# Patient Record
Sex: MALE | Race: WHITE | NOT HISPANIC OR LATINO | Employment: STUDENT | URBAN - METROPOLITAN AREA
[De-identification: names, ages, dates, MRNs, and addresses within clinical notes are randomized per-mention and may not be internally consistent; named-entity substitution may affect disease eponyms.]

---

## 2020-09-17 ENCOUNTER — OFFICE VISIT (OUTPATIENT)
Dept: INTERNAL MEDICINE | Facility: CLINIC | Age: 25
End: 2020-09-17
Payer: COMMERCIAL

## 2020-09-17 ENCOUNTER — IMMUNIZATION (OUTPATIENT)
Dept: INTERNAL MEDICINE | Facility: CLINIC | Age: 25
End: 2020-09-17
Payer: COMMERCIAL

## 2020-09-17 VITALS
OXYGEN SATURATION: 99 % | TEMPERATURE: 98 F | BODY MASS INDEX: 24.11 KG/M2 | DIASTOLIC BLOOD PRESSURE: 86 MMHG | HEART RATE: 63 BPM | WEIGHT: 181.88 LBS | SYSTOLIC BLOOD PRESSURE: 120 MMHG | HEIGHT: 73 IN

## 2020-09-17 DIAGNOSIS — R35.0 URINARY FREQUENCY: ICD-10-CM

## 2020-09-17 DIAGNOSIS — Z23 NEED FOR INFLUENZA VACCINATION: ICD-10-CM

## 2020-09-17 DIAGNOSIS — Z00.00 ANNUAL PHYSICAL EXAM: Primary | ICD-10-CM

## 2020-09-17 LAB
BILIRUB UR QL STRIP: NEGATIVE
CLARITY UR REFRACT.AUTO: CLEAR
COLOR UR AUTO: YELLOW
GLUCOSE UR QL STRIP: NEGATIVE
HGB UR QL STRIP: NEGATIVE
KETONES UR QL STRIP: NEGATIVE
LEUKOCYTE ESTERASE UR QL STRIP: NEGATIVE
MICROSCOPIC COMMENT: NORMAL
NITRITE UR QL STRIP: NEGATIVE
PH UR STRIP: 5 [PH] (ref 5–8)
PROT UR QL STRIP: NEGATIVE
RBC #/AREA URNS AUTO: 0 /HPF (ref 0–4)
SP GR UR STRIP: 1.03 (ref 1–1.03)
URN SPEC COLLECT METH UR: NORMAL
WBC #/AREA URNS AUTO: 1 /HPF (ref 0–5)

## 2020-09-17 PROCEDURE — 81001 URINALYSIS AUTO W/SCOPE: CPT

## 2020-09-17 PROCEDURE — 90471 FLU VACCINE (QUAD) GREATER THAN OR EQUAL TO 3YO PRESERVATIVE FREE IM: ICD-10-PCS | Mod: S$GLB,,, | Performed by: FAMILY MEDICINE

## 2020-09-17 PROCEDURE — 99385 PREV VISIT NEW AGE 18-39: CPT | Mod: 25,S$GLB,, | Performed by: FAMILY MEDICINE

## 2020-09-17 PROCEDURE — 99385 PR PREVENTIVE VISIT,NEW,18-39: ICD-10-PCS | Mod: 25,S$GLB,, | Performed by: FAMILY MEDICINE

## 2020-09-17 PROCEDURE — 90471 IMMUNIZATION ADMIN: CPT | Mod: S$GLB,,, | Performed by: FAMILY MEDICINE

## 2020-09-17 PROCEDURE — 87086 URINE CULTURE/COLONY COUNT: CPT

## 2020-09-17 PROCEDURE — 99999 PR PBB SHADOW E&M-NEW PATIENT-LVL IV: ICD-10-PCS | Mod: PBBFAC,,, | Performed by: FAMILY MEDICINE

## 2020-09-17 PROCEDURE — 99999 PR PBB SHADOW E&M-NEW PATIENT-LVL IV: CPT | Mod: PBBFAC,,, | Performed by: FAMILY MEDICINE

## 2020-09-17 PROCEDURE — 90686 IIV4 VACC NO PRSV 0.5 ML IM: CPT | Mod: S$GLB,,, | Performed by: FAMILY MEDICINE

## 2020-09-17 PROCEDURE — 90686 FLU VACCINE (QUAD) GREATER THAN OR EQUAL TO 3YO PRESERVATIVE FREE IM: ICD-10-PCS | Mod: S$GLB,,, | Performed by: FAMILY MEDICINE

## 2020-09-17 RX ORDER — BENZOYL PEROXIDE 146.7 MG/G
CREAM TOPICAL
COMMUNITY
Start: 2020-07-13 | End: 2022-01-27

## 2020-09-17 RX ORDER — CRISABOROLE 20 MG/G
OINTMENT TOPICAL 2 TIMES DAILY
COMMUNITY
End: 2022-07-26

## 2020-09-17 NOTE — PROGRESS NOTES
Subjective:       Patient ID: Braeden Keita is a 24 y.o. male.    Chief Complaint:   Establish Care and Urinary Frequency    Annual Exam  Last annual exam > 1 year ago.        Urinary Frequency   This is a new problem. Episode onset: beginning of August, 2020. The problem has been unchanged. The patient is experiencing no pain. There has been no fever. Associated symptoms include frequency. Pertinent negatives include no chills, discharge, flank pain, nausea, vomiting or rash.   Started with an episode where he couldn't urinate at all for several hours.  Went to urgent care.  U/A normal.  Was referred to urologist.  Cystoscopy and ultrasound normal.  Was told may be blockage from something else.  Has appt for colonoscopy in 1 month.  Review of Systems   Constitutional: Negative for appetite change, chills and fever.   HENT: Negative for ear pain and sore throat.    Eyes: Negative for pain and visual disturbance.   Respiratory: Negative for cough and shortness of breath.    Cardiovascular: Negative for chest pain.   Gastrointestinal: Negative for abdominal pain, nausea and vomiting.   Endocrine: Negative for polydipsia, polyphagia and polyuria.   Genitourinary: Positive for frequency. Negative for difficulty urinating and flank pain.   Musculoskeletal: Negative for arthralgias and myalgias.   Skin: Negative for rash.   Neurological: Negative for dizziness and headaches.   Psychiatric/Behavioral: Negative for behavioral problems.     Current Outpatient Medications   Medication Sig    crisaborole (EUCRISA) 2 % Oint Apply topically 2 (two) times daily.    PANOXYL 10 % external wash APPLY QD IN SHOWER TO ACNE BODY AND WASH OFF     No current facility-administered medications for this visit.      History reviewed. No pertinent past medical history.  History reviewed. No pertinent family history.  Social History     Tobacco Use    Smoking status: Former Smoker    Smokeless tobacco: Never Used   Substance Use Topics  "   Alcohol use: Yes     Alcohol/week: 4.0 standard drinks     Types: 2 Glasses of wine, 2 Cans of beer per week     Frequency: 2-4 times a month     Drinks per session: 1 or 2    Drug use: Never       Objective:      Vitals:    09/17/20 0814   BP: 120/86   BP Location: Right arm   Pulse: 63   Temp: 97.6 °F (36.4 °C)   SpO2: 99%   Weight: 82.5 kg (181 lb 14.1 oz)   Height: 6' 1" (1.854 m)     Physical Exam  Vitals signs and nursing note reviewed.   Constitutional:       Appearance: He is well-developed.   HENT:      Head: Normocephalic and atraumatic.      Right Ear: Tympanic membrane and external ear normal.      Left Ear: Tympanic membrane and external ear normal.      Nose: Nose normal.   Eyes:      Conjunctiva/sclera: Conjunctivae normal.      Pupils: Pupils are equal, round, and reactive to light.   Neck:      Musculoskeletal: Normal range of motion and neck supple.      Thyroid: No thyromegaly.      Vascular: No carotid bruit.   Cardiovascular:      Rate and Rhythm: Normal rate and regular rhythm.      Heart sounds: Normal heart sounds. No murmur. No friction rub. No gallop.    Pulmonary:      Effort: Pulmonary effort is normal.      Breath sounds: Normal breath sounds. No wheezing.   Abdominal:      General: Bowel sounds are normal.      Palpations: Abdomen is soft.      Tenderness: There is no abdominal tenderness.   Musculoskeletal: Normal range of motion.   Lymphadenopathy:      Cervical: No cervical adenopathy.   Skin:     General: Skin is warm and dry.      Findings: No rash.   Neurological:      Mental Status: He is alert and oriented to person, place, and time.   Psychiatric:         Behavior: Behavior normal.              Assessment and Plan:     Annual physical exam  -     CBC auto differential; Future  -     Comprehensive metabolic panel; Future  -     Lipid Panel; Future  -     TSH; Future  -     Urinalysis; Future  -     Hepatitis C Antibody; Future  -     HIV 1/2 Ag/Ab (4th Gen); Future; " Expected date: 09/17/2020    Need for influenza vaccination    Urinary frequency  -     Urine culture; Future    If frequency persists and GI workup normal will consider referral to another urologist for second opinion.     Follow up if symptoms worsen or fail to improve.      Ambrose Melendez MD

## 2020-09-17 NOTE — PATIENT INSTRUCTIONS
Urinary Retention (Male)  Urinary retention is the medical term for difficulty or inability to pass urine, even though your bladder is full.  Causes  The most common cause of urinary retention in men is the bladder outlet being blocked. This can be due to an enlarged prostate gland or a bladder infection. Certain medicines can also cause this problem. This condition is more likely to occur as men get older.    This condition is treated by insertion of a catheter into the bladder to drain the urine. This provides immediate relief. The catheter may need to remain in place for a few days to prevent a recurrence. The catheter has a balloon on the tip which was inflated after insertion. This prevents the catheter from falling out.  Symptoms  Common symptoms of urinary retention include:  · Pain (not experienced by everyone)  · Frequent urination  · Feeling that the bladder is still full after urinating  · Incontinence (not being able to control the release of urine)  · Swollen abdomen  Treatment  This condition is treated by inserting a tube (catheter) into the bladder to drain the urine. This provides immediate relief. The catheter may need to stay in place for a few days. The catheter has a balloon on the tip, which is inflated after insertion. This prevents the catheter from falling out.  Home care  · If you were given antibiotics, take them until they are used up, or your healthcare provider tells you to stop. It is important to finish the antibiotics even though you feel better. This is to make sure your infection has cleared.  · If a catheter was left in place, it is important to keep bacteria from getting into the collection bag. Do not disconnect the catheter from the collection bag.  · Use a leg band to secure the drainage tube, so it does not pull on the catheter. Drain the collection bag when it becomes full using the drain spout at the bottom of the bag.  · Do not pull on or try to remove your catheter.  This will injure your urethra. The catheter must be removed by a healthcare provider.  Follow-up care  Follow up with your healthcare provider, or as advised.  If a catheter was left in place, it can usually be removed within 3 to 7 days. Some conditions require the catheter to stay in longer. Your healthcare provider will tell you when to return to have the catheter removed.  When to seek medical advice  Call your healthcare provider right away if any of these occur:  · Fever of 100.4ºF (38ºC) or higher, or as directed by your healthcare provider  · Bladder or lower-abdominal pain or fullness  · Abdominal swelling, nausea, vomiting, or back pain  · Blood or urine leakage around the catheter  · Bloody urine coming from the catheter (if a new symptom)  · Weakness, dizziness, or fainting  · Confusion or change in usual level of alertness  · If a catheter was left in place, return if:  ¨ Catheter falls out  ¨ Catheter stops draining for 6 hours  Date Last Reviewed: 7/26/2015  © 1189-0281 [employer]. [address] [place] [address], [place] [address]. All rights reserved. This information is not intended as a substitute for professional medical care. Always follow your healthcare professional's instructions.

## 2020-09-17 NOTE — PROGRESS NOTES
Administered flu vaccine to the left deltoid, tolerated well, no c/o pain noted, no adverse reaction noted within.

## 2020-09-18 LAB — BACTERIA UR CULT: NO GROWTH

## 2020-09-19 ENCOUNTER — LAB VISIT (OUTPATIENT)
Dept: LAB | Facility: HOSPITAL | Age: 25
End: 2020-09-19
Attending: FAMILY MEDICINE
Payer: COMMERCIAL

## 2020-09-19 DIAGNOSIS — Z00.00 ANNUAL PHYSICAL EXAM: ICD-10-CM

## 2020-09-19 LAB
ALBUMIN SERPL BCP-MCNC: 4.1 G/DL (ref 3.5–5.2)
ALP SERPL-CCNC: 46 U/L (ref 55–135)
ALT SERPL W/O P-5'-P-CCNC: 18 U/L (ref 10–44)
ANION GAP SERPL CALC-SCNC: 8 MMOL/L (ref 8–16)
AST SERPL-CCNC: 18 U/L (ref 10–40)
BASOPHILS # BLD AUTO: 0.03 K/UL (ref 0–0.2)
BASOPHILS NFR BLD: 0.5 % (ref 0–1.9)
BILIRUB SERPL-MCNC: 0.7 MG/DL (ref 0.1–1)
BUN SERPL-MCNC: 18 MG/DL (ref 6–20)
CALCIUM SERPL-MCNC: 8.8 MG/DL (ref 8.7–10.5)
CHLORIDE SERPL-SCNC: 104 MMOL/L (ref 95–110)
CHOLEST SERPL-MCNC: 133 MG/DL (ref 120–199)
CHOLEST/HDLC SERPL: 3.5 {RATIO} (ref 2–5)
CO2 SERPL-SCNC: 29 MMOL/L (ref 23–29)
CREAT SERPL-MCNC: 1.1 MG/DL (ref 0.5–1.4)
DIFFERENTIAL METHOD: NORMAL
EOSINOPHIL # BLD AUTO: 0.1 K/UL (ref 0–0.5)
EOSINOPHIL NFR BLD: 1 % (ref 0–8)
ERYTHROCYTE [DISTWIDTH] IN BLOOD BY AUTOMATED COUNT: 11.9 % (ref 11.5–14.5)
EST. GFR  (AFRICAN AMERICAN): >60 ML/MIN/1.73 M^2
EST. GFR  (NON AFRICAN AMERICAN): >60 ML/MIN/1.73 M^2
GLUCOSE SERPL-MCNC: 87 MG/DL (ref 70–110)
HCT VFR BLD AUTO: 49 % (ref 40–54)
HDLC SERPL-MCNC: 38 MG/DL (ref 40–75)
HDLC SERPL: 28.6 % (ref 20–50)
HGB BLD-MCNC: 16.3 G/DL (ref 14–18)
IMM GRANULOCYTES # BLD AUTO: 0.01 K/UL (ref 0–0.04)
IMM GRANULOCYTES NFR BLD AUTO: 0.2 % (ref 0–0.5)
LDLC SERPL CALC-MCNC: 83.2 MG/DL (ref 63–159)
LYMPHOCYTES # BLD AUTO: 2 K/UL (ref 1–4.8)
LYMPHOCYTES NFR BLD: 33.6 % (ref 18–48)
MCH RBC QN AUTO: 29.5 PG (ref 27–31)
MCHC RBC AUTO-ENTMCNC: 33.3 G/DL (ref 32–36)
MCV RBC AUTO: 89 FL (ref 82–98)
MONOCYTES # BLD AUTO: 0.7 K/UL (ref 0.3–1)
MONOCYTES NFR BLD: 11.2 % (ref 4–15)
NEUTROPHILS # BLD AUTO: 3.2 K/UL (ref 1.8–7.7)
NEUTROPHILS NFR BLD: 53.5 % (ref 38–73)
NONHDLC SERPL-MCNC: 95 MG/DL
NRBC BLD-RTO: 0 /100 WBC
PLATELET # BLD AUTO: 238 K/UL (ref 150–350)
PMV BLD AUTO: 9.9 FL (ref 9.2–12.9)
POTASSIUM SERPL-SCNC: 4 MMOL/L (ref 3.5–5.1)
PROT SERPL-MCNC: 6.5 G/DL (ref 6–8.4)
RBC # BLD AUTO: 5.52 M/UL (ref 4.6–6.2)
SODIUM SERPL-SCNC: 141 MMOL/L (ref 136–145)
TRIGL SERPL-MCNC: 59 MG/DL (ref 30–150)
TSH SERPL DL<=0.005 MIU/L-ACNC: 1.85 UIU/ML (ref 0.4–4)
WBC # BLD AUTO: 6.05 K/UL (ref 3.9–12.7)

## 2020-09-19 PROCEDURE — 80053 COMPREHEN METABOLIC PANEL: CPT

## 2020-09-19 PROCEDURE — 85025 COMPLETE CBC W/AUTO DIFF WBC: CPT

## 2020-09-19 PROCEDURE — 86703 HIV-1/HIV-2 1 RESULT ANTBDY: CPT

## 2020-09-19 PROCEDURE — 84443 ASSAY THYROID STIM HORMONE: CPT

## 2020-09-19 PROCEDURE — 86803 HEPATITIS C AB TEST: CPT

## 2020-09-19 PROCEDURE — 80061 LIPID PANEL: CPT

## 2020-09-19 PROCEDURE — 36415 COLL VENOUS BLD VENIPUNCTURE: CPT

## 2020-09-21 ENCOUNTER — TELEPHONE (OUTPATIENT)
Dept: INTERNAL MEDICINE | Facility: CLINIC | Age: 25
End: 2020-09-21

## 2020-09-21 LAB
HCV AB SERPL QL IA: NEGATIVE
HIV 1+2 AB+HIV1 P24 AG SERPL QL IA: NEGATIVE

## 2020-09-21 NOTE — TELEPHONE ENCOUNTER
----- Message from Ambrose Melendez MD sent at 9/21/2020  9:37 AM CDT -----  His blood tests are normal.  Urine is clear.  No signs of infection.

## 2020-09-21 NOTE — TELEPHONE ENCOUNTER
----- Message from Josefina Puga sent at 9/21/2020 12:09 PM CDT -----  Contact: [phone]  Patient is returning a phone call.  Who left a message for the patient: Suhas  Does patient know what this is regarding:  no  Comments:

## 2020-11-30 ENCOUNTER — TELEPHONE (OUTPATIENT)
Dept: INTERNAL MEDICINE | Facility: CLINIC | Age: 25
End: 2020-11-30

## 2020-11-30 DIAGNOSIS — R35.0 URINARY FREQUENCY: Primary | ICD-10-CM

## 2020-11-30 NOTE — TELEPHONE ENCOUNTER
----- Message from Lesly Hillman sent at 11/30/2020 11:09 AM CST -----  Regarding: referral  Contact: [phone]  Pt is calling to speak with you regarding a referral to see a Urologist that you all discussed back in September. Please assist pt. [phone]

## 2020-12-04 ENCOUNTER — TELEPHONE (OUTPATIENT)
Dept: UROLOGY | Facility: CLINIC | Age: 25
End: 2020-12-04

## 2020-12-04 ENCOUNTER — OFFICE VISIT (OUTPATIENT)
Dept: UROLOGY | Facility: CLINIC | Age: 25
End: 2020-12-04
Payer: COMMERCIAL

## 2020-12-04 VITALS
DIASTOLIC BLOOD PRESSURE: 73 MMHG | WEIGHT: 185.19 LBS | SYSTOLIC BLOOD PRESSURE: 128 MMHG | HEIGHT: 73 IN | HEART RATE: 74 BPM | BODY MASS INDEX: 24.54 KG/M2

## 2020-12-04 DIAGNOSIS — R35.0 URINARY FREQUENCY: ICD-10-CM

## 2020-12-04 DIAGNOSIS — R39.15 URINARY URGENCY: ICD-10-CM

## 2020-12-04 DIAGNOSIS — R39.11 HESITANCY OF MICTURITION: Primary | ICD-10-CM

## 2020-12-04 PROCEDURE — 3008F BODY MASS INDEX DOCD: CPT | Mod: CPTII,S$GLB,, | Performed by: UROLOGY

## 2020-12-04 PROCEDURE — 81002 URINALYSIS NONAUTO W/O SCOPE: CPT | Mod: S$GLB,,, | Performed by: UROLOGY

## 2020-12-04 PROCEDURE — 99999 PR PBB SHADOW E&M-EST. PATIENT-LVL III: ICD-10-PCS | Mod: PBBFAC,,, | Performed by: UROLOGY

## 2020-12-04 PROCEDURE — 3008F PR BODY MASS INDEX (BMI) DOCUMENTED: ICD-10-PCS | Mod: CPTII,S$GLB,, | Performed by: UROLOGY

## 2020-12-04 PROCEDURE — 99205 OFFICE O/P NEW HI 60 MIN: CPT | Mod: 25,S$GLB,, | Performed by: UROLOGY

## 2020-12-04 PROCEDURE — 51798 US URINE CAPACITY MEASURE: CPT | Mod: S$GLB,,, | Performed by: UROLOGY

## 2020-12-04 PROCEDURE — 81002 PR URINALYSIS NONAUTO W/O SCOPE: ICD-10-PCS | Mod: S$GLB,,, | Performed by: UROLOGY

## 2020-12-04 PROCEDURE — 99999 PR PBB SHADOW E&M-EST. PATIENT-LVL III: CPT | Mod: PBBFAC,,, | Performed by: UROLOGY

## 2020-12-04 PROCEDURE — 1126F PR PAIN SEVERITY QUANTIFIED, NO PAIN PRESENT: ICD-10-PCS | Mod: S$GLB,,, | Performed by: UROLOGY

## 2020-12-04 PROCEDURE — 99205 PR OFFICE/OUTPT VISIT, NEW, LEVL V, 60-74 MIN: ICD-10-PCS | Mod: 25,S$GLB,, | Performed by: UROLOGY

## 2020-12-04 PROCEDURE — 1126F AMNT PAIN NOTED NONE PRSNT: CPT | Mod: S$GLB,,, | Performed by: UROLOGY

## 2020-12-04 PROCEDURE — 51798 PR MEAS,POST-VOID RES,US,NON-IMAGING: ICD-10-PCS | Mod: S$GLB,,, | Performed by: UROLOGY

## 2020-12-04 RX ORDER — LIDOCAINE HYDROCHLORIDE 20 MG/ML
JELLY TOPICAL ONCE
Status: CANCELLED | OUTPATIENT
Start: 2020-12-04 | End: 2020-12-04

## 2020-12-04 RX ORDER — HYOSCYAMINE SULFATE 0.125 MG
TABLET ORAL
COMMUNITY
Start: 2020-11-12 | End: 2022-01-27

## 2020-12-04 RX ORDER — BISMUTH SUBCITRATE POTASSIUM, METRONIDAZOLE, TETRACYCLINE HYDROCHLORIDE 140; 125; 125 MG/1; MG/1; MG/1
CAPSULE ORAL
COMMUNITY
Start: 2020-11-06 | End: 2022-01-27

## 2020-12-04 RX ORDER — DOXYCYCLINE HYCLATE 100 MG
100 TABLET ORAL ONCE
Status: CANCELLED | OUTPATIENT
Start: 2020-12-04 | End: 2020-12-04

## 2020-12-04 NOTE — PATIENT INSTRUCTIONS
Urodynamics Studies     The bladder holds urine until it leaves the body through the urethra.     Urodynamics studies are a series of tests that give your doctor a close look at the working of your bladder and urethra. The tests can help your doctor learn about any problems storing urine or voiding (eliminating) urine from your body.  Understanding the lower urinary tract  The lower part of the urinary tract has several parts.  · The bladder stores urine until youre ready to release it.  · The urethra is the tube that carries urine from the bladder out of the body.  · The sphincter is made up of muscles around the opening of the bladder. The sphincter muscles tighten to hold urine in the bladder. They relax to let urine flow. Signals from the brain tell the sphincter when to tighten and relax. These signals also tell the bladder when to contract to let urine flow out of the body.  Why you need a urodynamics study  This test may be ordered if you:  · Are incontinent (leak urine)  · Have a bladder that does not empty all the way.  · Have symptoms such as the need to urinate often or a constant strong need to urinate  · Have intermittent or weak urine stream  · Have persistent urinary tract infections  Preparing for the study  · Tell your doctor about any medicine youre taking. Ask if you should stop them before the study.  · Keep a diary of your bathroom habits. Do this for a few days before the study. This diary can be a helpful part of the evaluation.  · Ask if you need to arrive for the study with a full bladder.  Date Last Reviewed: 1/1/2017  © 9888-6769 [employer]. [address] [place] [address], [place] [address]. All rights reserved. This information is not intended as a substitute for professional medical care. Always follow your healthcare professional's instructions.          Urodynamic Studies     The equipment used for the study varies depending upon the facility and what tests are done.      Urodynamic studies may be done in your doctors office, a clinic, or a hospital. The studies may take up to an hour or more. This depends on which tests your doctor does. The tests are generally painless. You wont need sedating medicine.  Tests that may be done  Uroflowmetry. This measures the amount and speed of urine you void from your bladder. You urinate into a funnel. Its attached to a computer that records your urine flow over time. The amount of urine left in your bladder after you void may also be measured right after this test.  Cystometry. This test evaluates how much your bladder can hold. It also measures how strong your bladder muscle is and how well the signals work that tell you when your bladder is full. Your healthcare provider fills your bladder with sterile water or saline solution, through a catheter. Your doctor will instruct you to report any sensations you feel. Mention if theyre similar to symptoms youve felt at home. Your doctor may ask you to cough, stand and walk, or bear down during this test.  Electromyogram. This helps evaluate the muscle contractions that control urination, such as sphincter muscle contractions. Your healthcare provider may place electrode patches or wires near your rectum or urethra to make the recording. He or she may ask you to try to tighten or relax your sphincter muscles during this test.  Pressure flow study. This test measures your detrusor, urethral, and abdominal pressures. Detrusor is the muscle surrounding the bladder walls that relaxes to allow your bladder to fill, and and contracts to squeeze out urine. A pressure flow study is often done after cystometry. Youre asked to urinate while a probe in your urethra measures pressures.  Video cystourethrography. This takes video pictures of urine flow through your urinary tract. It can help identify blockages or other problems. The bladder is filled with an X-ray contrast fluid. Then X-ray video  pictures are taken as the fluid is urinated out. Ultrasound imaging may also be combined with routine urodynamic studies.  Ambulatory urodynamics. This test can be used to evaluate you while doing usual activities.  Getting your results  After the study, youll get dressed and return to the consultation room. Test results may be ready soon after the study is finished. Or, you may return to your doctors office in a few days for your results. Your doctor can talk with you about the study report and your options.   Date Last Reviewed: 1/1/2017  © 0745-5255 [employer]. [address] [place] [address]. All rights reserved. This information is not intended as a substitute for professional medical care. Always follow your healthcare professional's instructions.

## 2020-12-04 NOTE — H&P (VIEW-ONLY)
CHIEF COMPLAINT:    Mr. Keita is a 25 y.o. male presenting for a consultation at the request of Dr. Ambrose Melendez. Patient presents with urinary hesitancy, urgency, needing to strain to urinate.    PRESENTING ILLNESS:    Braeden Keita is a 25 y.o. male who states in August he noted having some difficulty with urinary hesitancy having to strain to urinate.  And on August 11th he presented to an urgent care in urinary retention.  He states that he was at actually able to produce a urine sample by straining pretty significantly.  A catheter was not placed at that time.  And he was referred to Dr. Alin Huntley.  He underwent cystoscopy with uroflow and PVR.  The patient was placed on tamsulosin which caused him to have insomnia and and really impact his lower urinary symptoms.  The patient's mother noted that as a child he was noted to have a megacolon however colonoscopy was not done at that time because they did not do them in children that young, he was 9 at the time.  He was referred to GI and Colorectal surgery he underwent anal manometry.  He also states that he had a CT scan in which she was found to have a dilated colon.  And has been referred to physical therapy.  He was supposed to have a visit yesterday at [facility], however due to a scheduling problem they had to reschedule the appointment.  He denies a history of any neurologic issues.  He has commented that he might have a slight scoliosis, but no other issues.  It is interesting because the symptoms are intermittent.    He has no issues with erectile dysfunction.      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    REVIEW OF SYSTEMS:    Review of Systems   Constitutional: Negative.    HENT: Negative.    Eyes: Negative.    Cardiovascular: Negative.    Gastrointestinal: Negative.    Genitourinary:        Urinary hesitancy, slow urinary stream, needs to strain   Musculoskeletal: Negative.    Skin: Negative.    Neurological: Negative.     Endo/Heme/Allergies: Negative.    Psychiatric/Behavioral: Negative.        PATIENT HISTORY:    Past Medical History:   Diagnosis Date    Acne     Heartburn        Past Surgical History:   Procedure Laterality Date    WISDOM TOOTH EXTRACTION         History reviewed. No pertinent family history.    Social History     Socioeconomic History    Marital status: Single   Tobacco Use    Smoking status: Former Smoker    Smokeless tobacco: Never Used   Substance and Sexual Activity    Alcohol use: Yes     Alcohol/week: 4.0 standard drinks     Types: 2 Glasses of wine, 2 Cans of beer per week     Frequency: 2-4 times a month     Drinks per session: 1 or 2    Drug use: Never    Sexual activity: Yes       Allergies:  Patient has no known allergies.    Medications:  Outpatient Encounter Medications as of 12/4/2020   Medication Sig Dispense Refill    crisaborole (EUCRISA) 2 % Oint Apply topically 2 (two) times daily.      hyoscyamine (ANASPAZ,LEVSIN) 0.125 mg Tab TK 1 T PO QID PRF SPASM      PANOXYL 10 % external wash APPLY QD IN SHOWER TO ACNE BODY AND WASH OFF      PYLERA 140-125-125 mg per capsule TK 3 CS PO QID FOR 10 DAYS       No facility-administered encounter medications on file as of 12/4/2020.          PHYSICAL EXAMINATION:    The patient generally appears in good health, is appropriately interactive, and is in no apparent distress.    Skin: No lesions.    Mental: Cooperative with normal affect.    Neuro: Grossly intact.    HEENT: Normal. No evidence of lymphadenopathy.    Chest:  normal inspiratory effort.    Abdomen: Soft, non-tender. No masses or organomegaly. Bladder is not palpable. No evidence of flank discomfort. No evidence of inguinal hernia.    Extremities: No clubbing, cyanosis, or edema    Scrotum showed no rashes or lesions. Testicles showed no masses or tenderness.  Epididymis showed no masses or tenderness.  Penis was circumcised. No meatal stenosis. No penile discharge.  No inguinal  hernias.  No inguinal lymphadenopathy.  Intact cremasteric reflexes bilaterally.  He states he has normal sensation during the exam.   RICHIE:  Slightly decreased rectal tone, no masses.  Anus is visually normal  PVR by bladder scan was 0 ml    LABS:    Lab Results   Component Value Date    BUN 18 09/19/2020    CREATININE 1.1 09/19/2020     UA 1.025, pH 5, otherwise, negative      IMPRESSION:    Encounter Diagnoses   Name Primary?    Hesitancy of micturition Yes    Urinary frequency     Urinary urgency        PLAN:    1.  For FUDS.  Asked that he bring the CD of the CT scan which was done at [facility]  2.  Would consider for SNM.      Copy to:  Ambrose Melendez MD

## 2020-12-04 NOTE — PROGRESS NOTES
CHIEF COMPLAINT:    Mr. Ketia is a 25 y.o. male presenting for a consultation at the request of Dr. Ambrose Melendez. Patient presents with urinary hesitancy, urgency, needing to strain to urinate.    PRESENTING ILLNESS:    Braeden Keita is a 25 y.o. male who states in August he noted having some difficulty with urinary hesitancy having to strain to urinate.  And on August 11th he presented to an urgent care in urinary retention.  He states that he was at actually able to produce a urine sample by straining pretty significantly.  A catheter was not placed at that time.  And he was referred to Dr. Alin Huntley.  He underwent cystoscopy with uroflow and PVR.  The patient was placed on tamsulosin which caused him to have insomnia and and really impact his lower urinary symptoms.  The patient's mother noted that as a child he was noted to have a megacolon however colonoscopy was not done at that time because they did not do them in children that young, he was 9 at the time.  He was referred to GI and Colorectal surgery he underwent anal manometry.  He also states that he had a CT scan in which she was found to have a dilated colon.  And has been referred to physical therapy.  He was supposed to have a visit yesterday at [facility], however due to a scheduling problem they had to reschedule the appointment.  He denies a history of any neurologic issues.  He has commented that he might have a slight scoliosis, but no other issues.  It is interesting because the symptoms are intermittent.    He has no issues with erectile dysfunction.      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    REVIEW OF SYSTEMS:    Review of Systems   Constitutional: Negative.    HENT: Negative.    Eyes: Negative.    Cardiovascular: Negative.    Gastrointestinal: Negative.    Genitourinary:        Urinary hesitancy, slow urinary stream, needs to strain   Musculoskeletal: Negative.    Skin: Negative.    Neurological: Negative.     Endo/Heme/Allergies: Negative.    Psychiatric/Behavioral: Negative.        PATIENT HISTORY:    Past Medical History:   Diagnosis Date    Acne     Heartburn        Past Surgical History:   Procedure Laterality Date    WISDOM TOOTH EXTRACTION         History reviewed. No pertinent family history.    Social History     Socioeconomic History    Marital status: Single   Tobacco Use    Smoking status: Former Smoker    Smokeless tobacco: Never Used   Substance and Sexual Activity    Alcohol use: Yes     Alcohol/week: 4.0 standard drinks     Types: 2 Glasses of wine, 2 Cans of beer per week     Frequency: 2-4 times a month     Drinks per session: 1 or 2    Drug use: Never    Sexual activity: Yes       Allergies:  Patient has no known allergies.    Medications:  Outpatient Encounter Medications as of 12/4/2020   Medication Sig Dispense Refill    crisaborole (EUCRISA) 2 % Oint Apply topically 2 (two) times daily.      hyoscyamine (ANASPAZ,LEVSIN) 0.125 mg Tab TK 1 T PO QID PRF SPASM      PANOXYL 10 % external wash APPLY QD IN SHOWER TO ACNE BODY AND WASH OFF      PYLERA 140-125-125 mg per capsule TK 3 CS PO QID FOR 10 DAYS       No facility-administered encounter medications on file as of 12/4/2020.          PHYSICAL EXAMINATION:    The patient generally appears in good health, is appropriately interactive, and is in no apparent distress.    Skin: No lesions.    Mental: Cooperative with normal affect.    Neuro: Grossly intact.    HEENT: Normal. No evidence of lymphadenopathy.    Chest:  normal inspiratory effort.    Abdomen: Soft, non-tender. No masses or organomegaly. Bladder is not palpable. No evidence of flank discomfort. No evidence of inguinal hernia.    Extremities: No clubbing, cyanosis, or edema    Scrotum showed no rashes or lesions. Testicles showed no masses or tenderness.  Epididymis showed no masses or tenderness.  Penis was circumcised. No meatal stenosis. No penile discharge.  No inguinal  hernias.  No inguinal lymphadenopathy.  Intact cremasteric reflexes bilaterally.  He states he has normal sensation during the exam.   RICHIE:  Slightly decreased rectal tone, no masses.  Anus is visually normal  PVR by bladder scan was 0 ml    LABS:    Lab Results   Component Value Date    BUN 18 09/19/2020    CREATININE 1.1 09/19/2020     UA 1.025, pH 5, otherwise, negative      IMPRESSION:    Encounter Diagnoses   Name Primary?    Hesitancy of micturition Yes    Urinary frequency     Urinary urgency        PLAN:    1.  For FUDS.  Asked that he bring the CD of the CT scan which was done at [facility]  2.  Would consider for SNM.      Copy to:  Ambrose Melendez MD

## 2020-12-28 ENCOUNTER — TELEPHONE (OUTPATIENT)
Dept: UROLOGY | Facility: CLINIC | Age: 25
End: 2020-12-28

## 2020-12-28 NOTE — TELEPHONE ENCOUNTER
Called pt to confirm arrival time of 1:30 for procedure on 12/29. Gave pt NPO instructions and gave pt opportunity to ask questions. Pt verbalized understanding.    Pt was informed that only 1 person would be allowed to accompany them the morning of surgery.  Pt verbalized understanding.

## 2020-12-29 ENCOUNTER — HOSPITAL ENCOUNTER (OUTPATIENT)
Facility: HOSPITAL | Age: 25
Discharge: HOME | End: 2020-12-29
Attending: UROLOGY | Admitting: UROLOGY
Payer: COMMERCIAL

## 2020-12-29 VITALS
HEIGHT: 73 IN | RESPIRATION RATE: 18 BRPM | TEMPERATURE: 98 F | DIASTOLIC BLOOD PRESSURE: 81 MMHG | OXYGEN SATURATION: 96 % | HEART RATE: 86 BPM | SYSTOLIC BLOOD PRESSURE: 129 MMHG | BODY MASS INDEX: 24.52 KG/M2 | WEIGHT: 185 LBS

## 2020-12-29 DIAGNOSIS — N32.9 BLADDER DISORDER: ICD-10-CM

## 2020-12-29 PROCEDURE — 51784 PR ANAL/URINARY MUSCLE STUDY: ICD-10-PCS | Mod: 26,51,, | Performed by: UROLOGY

## 2020-12-29 PROCEDURE — 76000 FLUOROSCOPY <1 HR PHYS/QHP: CPT | Mod: 26,,, | Performed by: UROLOGY

## 2020-12-29 PROCEDURE — 36000705 HC OR TIME LEV I EA ADD 15 MIN: Performed by: UROLOGY

## 2020-12-29 PROCEDURE — 51726 PR CYSTOMETROGRAM, COMPLEX: ICD-10-PCS | Mod: 26,,, | Performed by: UROLOGY

## 2020-12-29 PROCEDURE — 27200973 HC CYSTO SUPPLY IV (URODYNAMICS): Performed by: UROLOGY

## 2020-12-29 PROCEDURE — 76000 PR  FLUOROSCOPE EXAMINATION: ICD-10-PCS | Mod: 26,,, | Performed by: UROLOGY

## 2020-12-29 PROCEDURE — 51726 COMPLEX CYSTOMETROGRAM: CPT | Mod: 26,,, | Performed by: UROLOGY

## 2020-12-29 PROCEDURE — 51784 ANAL/URINARY MUSCLE STUDY: CPT | Mod: 26,51,, | Performed by: UROLOGY

## 2020-12-29 PROCEDURE — 36000704 HC OR TIME LEV I 1ST 15 MIN: Performed by: UROLOGY

## 2020-12-30 NOTE — INTERVAL H&P NOTE
The patient has been examined and the H&P has been reviewed:    I concur with the findings and no changes have occurred since H&P was written.    Surgery risks, benefits and alternative options discussed and understood by patient/family.              There are no hospital problems to display for this patient.

## 2020-12-30 NOTE — OP NOTE
Date of procedure:  12/29/2020    Fluoro Urodynamic Report    Indication:  Urinary hesitancy, history of large bowel dilation and chronic constipation.     Patient was taken to the Urodynamic Suite with a comfortably full bladder and asked to perform a free uroflow.  Next, the patient was prepped and the urinary residual was drained with a 14 Fr catheter.  A 7 Fr dual lumen catheter was placed to measure intravesical pressures.  A 10 Fr balloon manometer was placed into the rectum for abdominal pressure measurements.  Patch EMG electrodes were placed on the perineum.  The patient was connected to the [employer] Urodynamic machine, using a multichannel technique.  The bladder was filled with Cysto ConRay at room temperature at a rate of 50 ml/min.  Patient is filled to urgency.  Filling is performed with the patient in the seated position.  The patient was then asked to sit and void for a pressure flow study.    The following are the results of the study:  1.  Uroflow       Q max:  Could not void    2.  Amount in the bladder was 800 ml    3.  CMG       Sensation:         First Desire:  132.2 ml         Normal Desire:  237.2 ml         Strong Desire:  295.1 ml         Urgency:  338.5 ml       Capacity:  700 ml       Abnormal Contractions:  none       Compliance:  normal    4.  EMG:  Normal guarding, strain pattern attempting to void    6.  Voiding phase       Q max:  Could not void       P det at Q max:       Pattern of the curve:       Voided volume:       Amount in the bladder was 700 ml    7.  Fluoroscopy:  Bladder is smooth, no space occupying lesions.  The bladder neck did not open.  Flaccid bladder    8.  Analysis:  Normal sensation and larger capacity.  Could not empty.  Patient states he has a shy bladder.  Intermittently able to empty his bladder    9.  Recommendations:       A.  He brought in his CD of the CT scan. Had markedly dilated colon with air and stool throughout.  The kidneys were normal.  Bladder was  not thickened.       B.  He is doing physical therapy and feels that he is benefiting from it.  Recommended continuing PT       C.  Follow up in 6 months.  We discussed SNM both [employer] and [employer].  Will send him the brochures.  If the PT does not work well enough for him, then would consider SNM.  Can help both the urine and bowels.

## 2021-10-05 ENCOUNTER — PATIENT MESSAGE (OUTPATIENT)
Dept: ADMINISTRATIVE | Facility: HOSPITAL | Age: 26
End: 2021-10-05

## 2022-01-26 ENCOUNTER — PATIENT MESSAGE (OUTPATIENT)
Dept: ADMINISTRATIVE | Facility: HOSPITAL | Age: 27
End: 2022-01-26
Payer: COMMERCIAL

## 2022-01-27 ENCOUNTER — LAB VISIT (OUTPATIENT)
Dept: LAB | Facility: HOSPITAL | Age: 27
End: 2022-01-27
Payer: COMMERCIAL

## 2022-01-27 ENCOUNTER — OFFICE VISIT (OUTPATIENT)
Dept: INTERNAL MEDICINE | Facility: CLINIC | Age: 27
End: 2022-01-27
Payer: COMMERCIAL

## 2022-01-27 VITALS
DIASTOLIC BLOOD PRESSURE: 88 MMHG | WEIGHT: 184.94 LBS | HEIGHT: 73 IN | BODY MASS INDEX: 24.51 KG/M2 | OXYGEN SATURATION: 99 % | SYSTOLIC BLOOD PRESSURE: 130 MMHG | HEART RATE: 78 BPM

## 2022-01-27 DIAGNOSIS — Z00.00 LABORATORY EXAM ORDERED AS PART OF ROUTINE GENERAL MEDICAL EXAMINATION: ICD-10-CM

## 2022-01-27 DIAGNOSIS — Z00.00 ENCOUNTER FOR ANNUAL PHYSICAL EXAM: Primary | ICD-10-CM

## 2022-01-27 DIAGNOSIS — F51.3 SLEEP WALKING AND EATING: ICD-10-CM

## 2022-01-27 DIAGNOSIS — Z86.19 HISTORY OF HELICOBACTER PYLORI INFECTION: ICD-10-CM

## 2022-01-27 DIAGNOSIS — L30.9 ECZEMA, UNSPECIFIED TYPE: ICD-10-CM

## 2022-01-27 DIAGNOSIS — Q43.1: ICD-10-CM

## 2022-01-27 DIAGNOSIS — Z86.16 HISTORY OF COVID-19: ICD-10-CM

## 2022-01-27 PROBLEM — K21.9 GASTROESOPHAGEAL REFLUX DISEASE: Status: ACTIVE | Noted: 2022-01-27

## 2022-01-27 LAB
ANION GAP SERPL CALC-SCNC: 9 MMOL/L (ref 8–16)
BUN SERPL-MCNC: 17 MG/DL (ref 6–20)
CALCIUM SERPL-MCNC: 9.8 MG/DL (ref 8.7–10.5)
CHLORIDE SERPL-SCNC: 104 MMOL/L (ref 95–110)
CO2 SERPL-SCNC: 28 MMOL/L (ref 23–29)
CREAT SERPL-MCNC: 1 MG/DL (ref 0.5–1.4)
ERYTHROCYTE [DISTWIDTH] IN BLOOD BY AUTOMATED COUNT: 11.9 % (ref 11.5–14.5)
EST. GFR  (AFRICAN AMERICAN): >60 ML/MIN/1.73 M^2
EST. GFR  (NON AFRICAN AMERICAN): >60 ML/MIN/1.73 M^2
GLUCOSE SERPL-MCNC: 84 MG/DL (ref 70–110)
HCT VFR BLD AUTO: 50.6 % (ref 40–54)
HGB BLD-MCNC: 16.8 G/DL (ref 14–18)
MCH RBC QN AUTO: 29.3 PG (ref 27–31)
MCHC RBC AUTO-ENTMCNC: 33.2 G/DL (ref 32–36)
MCV RBC AUTO: 88 FL (ref 82–98)
PLATELET # BLD AUTO: 261 K/UL (ref 150–450)
PMV BLD AUTO: 9.7 FL (ref 9.2–12.9)
POTASSIUM SERPL-SCNC: 5 MMOL/L (ref 3.5–5.1)
RBC # BLD AUTO: 5.74 M/UL (ref 4.6–6.2)
SODIUM SERPL-SCNC: 141 MMOL/L (ref 136–145)
TSH SERPL DL<=0.005 MIU/L-ACNC: 0.75 UIU/ML (ref 0.4–4)
WBC # BLD AUTO: 5.58 K/UL (ref 3.9–12.7)

## 2022-01-27 PROCEDURE — 99204 OFFICE O/P NEW MOD 45 MIN: CPT | Mod: S$GLB,,, | Performed by: INTERNAL MEDICINE

## 2022-01-27 PROCEDURE — 99999 PR PBB SHADOW E&M-EST. PATIENT-LVL IV: ICD-10-PCS | Mod: PBBFAC,,, | Performed by: INTERNAL MEDICINE

## 2022-01-27 PROCEDURE — 36415 COLL VENOUS BLD VENIPUNCTURE: CPT | Performed by: INTERNAL MEDICINE

## 2022-01-27 PROCEDURE — 99999 PR PBB SHADOW E&M-EST. PATIENT-LVL IV: CPT | Mod: PBBFAC,,, | Performed by: INTERNAL MEDICINE

## 2022-01-27 PROCEDURE — 99204 PR OFFICE/OUTPT VISIT, NEW, LEVL IV, 45-59 MIN: ICD-10-PCS | Mod: S$GLB,,, | Performed by: INTERNAL MEDICINE

## 2022-01-27 PROCEDURE — 84443 ASSAY THYROID STIM HORMONE: CPT | Performed by: INTERNAL MEDICINE

## 2022-01-27 PROCEDURE — 85027 COMPLETE CBC AUTOMATED: CPT | Performed by: INTERNAL MEDICINE

## 2022-01-27 PROCEDURE — 80048 BASIC METABOLIC PNL TOTAL CA: CPT | Performed by: INTERNAL MEDICINE

## 2022-01-27 NOTE — PROGRESS NOTES
Subjective:       Patient ID: Braeden Keita is a 26 y.o. male.    Chief Complaint: Follow-up (Covid, has a bad smell)      Braeden Keita is a 26 y.o. year old male    HPI   26 y.o. with history of congential megacolon, history of sleep walking/eating, eczema presents for establishment of care. Ever since covid-19 he has noticed a change in his sense of smell.      Review of Systems   Constitutional: Negative for activity change, appetite change, chills, fatigue, fever and unexpected weight change.   HENT: Negative for congestion, rhinorrhea and sore throat.    Eyes: Negative for visual disturbance.   Respiratory: Negative for shortness of breath.    Cardiovascular: Negative for chest pain.   Gastrointestinal: Negative for abdominal pain, diarrhea, nausea and vomiting.   Genitourinary: Negative for difficulty urinating and dysuria.   Musculoskeletal: Negative for arthralgias, back pain and myalgias.   Skin: Negative for color change and rash.   Neurological: Negative for dizziness, weakness and headaches.         Past Medical History:   Diagnosis Date    Acne     Heartburn         Prior to Admission medications    Medication Sig Start Date End Date Taking? Authorizing Provider   crisaborole (EUCRISA) 2 % Oint Apply topically 2 (two) times daily.   Yes Historical Provider   hyoscyamine (ANASPAZ,LEVSIN) 0.125 mg Tab TK 1 T PO QID PRF SPASM 11/12/20 1/27/22  Historical Provider   PANOXYL 10 % external wash APPLY QD IN SHOWER TO ACNE BODY AND WASH OFF 7/13/20 1/27/22  Historical Provider   PYLERA 140-125-125 mg per capsule TK 3 CS PO QID FOR 10 DAYS 11/6/20 1/27/22  Historical Provider        Past medical history, surgical history, and family medical history reviewed and updated as appropriate.    Medications and allergies reviewed.     Objective:          Vitals:    01/27/22 1258   BP: 130/88   BP Location: Right arm   Patient Position: Sitting   BP Method: Medium (Manual)   Pulse: 78   SpO2: 99%   Weight: 83.9 kg  "(184 lb 15.5 oz)   Height: 6' 1" (1.854 m)     Body mass index is 24.4 kg/m².  Physical Exam  Constitutional:       General: He is not in acute distress.     Appearance: He is well-developed.   HENT:      Head: Normocephalic and atraumatic.      Nose: Nose normal.   Eyes:      General: No scleral icterus.     Extraocular Movements: Extraocular movements intact.   Neck:      Thyroid: No thyromegaly.      Vascular: No JVD.      Trachea: No tracheal deviation.   Cardiovascular:      Rate and Rhythm: Normal rate and regular rhythm.      Heart sounds: Normal heart sounds. No murmur heard.    No friction rub. No gallop.   Pulmonary:      Effort: Pulmonary effort is normal. No respiratory distress.      Breath sounds: Normal breath sounds. No wheezing or rales.   Abdominal:      General: Bowel sounds are normal. There is no distension.      Palpations: Abdomen is soft. There is no mass.      Tenderness: There is no abdominal tenderness.   Musculoskeletal:         General: No tenderness. Normal range of motion.      Cervical back: Normal range of motion and neck supple.   Lymphadenopathy:      Cervical: No cervical adenopathy.   Skin:     General: Skin is warm and dry.      Findings: No rash.   Neurological:      Mental Status: He is alert and oriented to person, place, and time.      Cranial Nerves: No cranial nerve deficit.      Deep Tendon Reflexes: Reflexes normal.   Psychiatric:         Behavior: Behavior normal.         Lab Results   Component Value Date    WBC 6.05 09/19/2020    HGB 16.3 09/19/2020    HCT 49.0 09/19/2020     09/19/2020    CHOL 133 09/19/2020    TRIG 59 09/19/2020    HDL 38 (L) 09/19/2020    ALT 18 09/19/2020    AST 18 09/19/2020     09/19/2020    K 4.0 09/19/2020     09/19/2020    CREATININE 1.1 09/19/2020    BUN 18 09/19/2020    CO2 29 09/19/2020    TSH 1.849 09/19/2020       Assessment:       1. Congenital megacolon    2. History of Helicobacter pylori infection    3. Eczema, " unspecified type          Plan:     Braeden was seen today for follow-up.    Diagnoses and all orders for this visit:    Encounter for annual physical exam    Laboratory exam ordered as part of routine general medical examination  -     TSH; Future  -     Basic Metabolic Panel; Future  -     CBC Without Differential; Future    Congenital megacolon  Comments:  follows with [facility].    History of Helicobacter pylori infection  Comments:  s/p treatment with pylera    Eczema, unspecified type  Comments:  follows with outside derm, has eucrisa PRN; is actively looking to find another dermatologist, declines referral at this time.    Sleep walking and eating  -     Ambulatory referral/consult to Sleep Disorders; Future  -     TSH; Future    History of COVID-19      Instructed to catch up on immunizations --  covid-19 booster   Influenza  gardisil series (1st dose, 2 months, 6 months)  tetanus    Health maintenance reviewed with patient.     Follow up in about 1 year (around 1/27/2023).    Will Quick MD  Internal Medicine / Primary Care  [facility]  1/27/2022

## 2022-01-27 NOTE — PATIENT INSTRUCTIONS
Immunizations due - you do not need an appointment, can go to the immunization center to get these alex.  [   ] covid-19 booster [phone] covid-19 hotline  [   ] Influenza  [   ] [   ] [   ] gardisil series (1st dose, 2 months, 6 months)  [   ] Tetanus    Return to clinic in 1 year or sooner if needed

## 2022-02-07 ENCOUNTER — TELEPHONE (OUTPATIENT)
Dept: INTERNAL MEDICINE | Facility: CLINIC | Age: 27
End: 2022-02-07
Payer: COMMERCIAL

## 2022-02-07 NOTE — TELEPHONE ENCOUNTER
----- Message from Yamini Kim MA sent at 2/7/2022  1:24 PM CST -----  Contact: [phone] Eren Cespedes    ----- Message -----  From: Qian Parr  Sent: 2/7/2022  12:50 PM CST  To: Abdelrahman Solis Staff    Eren manzanares/ Rubina Cespedes called to request a diagnosis code for patient's sleep study referral. Please call and advise

## 2022-02-10 ENCOUNTER — OFFICE VISIT (OUTPATIENT)
Dept: SLEEP MEDICINE | Facility: CLINIC | Age: 27
End: 2022-02-10
Payer: COMMERCIAL

## 2022-02-10 VITALS
SYSTOLIC BLOOD PRESSURE: 107 MMHG | BODY MASS INDEX: 24.64 KG/M2 | DIASTOLIC BLOOD PRESSURE: 73 MMHG | HEART RATE: 81 BPM | WEIGHT: 186.75 LBS

## 2022-02-10 DIAGNOSIS — Z01.818 PRE-OP TESTING: ICD-10-CM

## 2022-02-10 DIAGNOSIS — G47.01 INSOMNIA DUE TO MEDICAL CONDITION: ICD-10-CM

## 2022-02-10 DIAGNOSIS — R06.83 SNORING: ICD-10-CM

## 2022-02-10 DIAGNOSIS — G47.33 OSA (OBSTRUCTIVE SLEEP APNEA): ICD-10-CM

## 2022-02-10 DIAGNOSIS — F51.3 SLEEP WALKING AND EATING: Primary | ICD-10-CM

## 2022-02-10 PROCEDURE — 99999 PR PBB SHADOW E&M-EST. PATIENT-LVL III: CPT | Mod: PBBFAC,,, | Performed by: INTERNAL MEDICINE

## 2022-02-10 PROCEDURE — 99999 PR PBB SHADOW E&M-EST. PATIENT-LVL III: ICD-10-PCS | Mod: PBBFAC,,, | Performed by: INTERNAL MEDICINE

## 2022-02-10 PROCEDURE — 99203 PR OFFICE/OUTPT VISIT, NEW, LEVL III, 30-44 MIN: ICD-10-PCS | Mod: S$GLB,,, | Performed by: INTERNAL MEDICINE

## 2022-02-10 PROCEDURE — 99203 OFFICE O/P NEW LOW 30 MIN: CPT | Mod: S$GLB,,, | Performed by: INTERNAL MEDICINE

## 2022-02-10 NOTE — PROGRESS NOTES
"Subjective:       Patient ID: Braeden Keita is a 26 y.o. male.    Chief Complaint: Sleeping Problem    I had the pleasure of seeing Braeden Keita today, who is a 26 y.o. male that presents with sleep walking.    Braeden Keita does not have a CDL.    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    Braeden Keita presents with sleep walking that has been going on for 2 years   He wakes up in the morning and finds food on the counter.   Sometimes he wakes up during the event.   Activity always involves food.   No recent weight change.   Past history of HOWIE diagnosed in child but told he would "outgrow it".   Suspects most events around 0300    Bedtime when working ranges from 2400 to 2430.   When not working, bedtime ranges from 0100 to 0130.   Sleep latency ranges from 30 to 60 minutes.     Average number of awakenings is 0-2 and return to sleep is quick.   Wake up time when working is 0730 to 0740.   When not working, wake up time is 0830 to 0930.   Patient does not feel rested upon awakening.    Braeden Keita consumes approximately <1 beverages with caffeine daily.   An average of 2 beverages with alcohol are consumed weekly   Medications taken for sleep currently: none  Previous medications taken: none     Braeden Keita does not experience daytime sleepiness.   Naps are taken about 1-2 times weekly, usually lasting 60 to 120 minutes.  Braeden currently does operate an automobile.  Braeden Keita does not experience drowsiness when driving.   Patient does doze off when sedentary.   Braeden Keita does not have auxiliary symptoms of narcolepsy including sleep onset paralysis, hypnagogic hallucinations, sleep attacks and cataplexy    EPWORTH SLEEPINESS SCALE 2/10/2022   Sitting and reading 2   Watching TV 1   Sitting, inactive in a public place (e.g. a theatre or a meeting) 1   As a passenger in a car for an hour without a break 1   Lying down to rest in the afternoon when circumstances permit 3   Sitting and talking to " someone 0   Sitting quietly after a lunch without alcohol 0   In a car, while stopped for a few minutes in traffic 1   Total score 9       Braeden Keita has a history of snoring.   Snoring is described as mild and intermittent.   Apneic episodes have not been noticed during sleep.   A witness to sleep is not present.   The patient awakens with mouth dryness.      Braeden Keita does not have symptoms of Restless Legs Syndrome. Nocturnal leg movements have not been noticed.   The patient does not experience sleep related leg cramps.   There is a history of parasomnia including sleep walking and sleep related eating.      Current Outpatient Medications:     crisaborole (EUCRISA) 2 % Oint, Apply topically 2 (two) times daily., Disp: , Rfl:      Review of patient's allergies indicates:  No Known Allergies      Past Medical History:   Diagnosis Date    Acne     Heartburn        Past Surgical History:   Procedure Laterality Date    FLUOROSCOPIC URODYNAMIC STUDY N/A 12/29/2020    Procedure: URODYNAMIC STUDY, FLUOROSCOPIC;  Surgeon: Carlyn Barrios MD;  Location: [facility] OR [address] [place] [address];  Service: Urology;  Laterality: N/A;  90 minutes     WISDOM TOOTH EXTRACTION         History reviewed. No pertinent family history.    Social History     Socioeconomic History    Marital status: Single   Tobacco Use    Smoking status: Former Smoker    Smokeless tobacco: Never Used   Substance and Sexual Activity    Alcohol use: Yes     Alcohol/week: 4.0 standard drinks     Types: 2 Glasses of wine, 2 Cans of beer per week    Drug use: Never    Sexual activity: Yes           Old medical records.    Vitals:    02/10/22 0845   BP: 107/73   Pulse: 81              The patient was given open opportunity to ask questions and/or express concerns about treatment plan.   All questions/concerns were discussed.   Driving precautions were provided.     Two patient identifiers used prior to evaluation.    Thank you for referring Braeden Keita for  evaluation.             Past Medical History:   Diagnosis Date    Acne     Heartburn      Past Surgical History:   Procedure Laterality Date    FLUOROSCOPIC URODYNAMIC STUDY N/A 12/29/2020    Procedure: URODYNAMIC STUDY, FLUOROSCOPIC;  Surgeon: Carlyn Barrios MD;  Location: [facility] OR [address] [place] [address];  Service: Urology;  Laterality: N/A;  90 minutes     WISDOM TOOTH EXTRACTION       History reviewed. No pertinent family history.  Social History     Socioeconomic History    Marital status: Single   Tobacco Use    Smoking status: Former Smoker    Smokeless tobacco: Never Used   Substance and Sexual Activity    Alcohol use: Yes     Alcohol/week: 4.0 standard drinks     Types: 2 Glasses of wine, 2 Cans of beer per week    Drug use: Never    Sexual activity: Yes       Current Outpatient Medications   Medication Sig Dispense Refill    crisaborole (EUCRISA) 2 % Oint Apply topically 2 (two) times daily.       No current facility-administered medications for this visit.     Review of patient's allergies indicates:  No Known Allergies    Review of Systems    Objective:      Vitals:    02/10/22 0845   BP: 107/73   BP Location: Left arm   Patient Position: Sitting   BP Method: Medium (Automatic)   Pulse: 81   Weight: 84.7 kg (186 lb 11.7 oz)     Physical Exam    Lab Review:   BMP:   Lab Results   Component Value Date    GLU 84 01/27/2022     01/27/2022    K 5.0 01/27/2022     01/27/2022    CO2 28 01/27/2022    BUN 17 01/27/2022    CREATININE 1.0 01/27/2022    CALCIUM 9.8 01/27/2022     Diagnostics Review: Echo: Reviewed     Assessment:       1. Insomnia due to medical condition    2. Sleep walking and eating    3. HOWIE (obstructive sleep apnea)        Plan:       Due to listed symptoms, a polysomnogram-seizure montage is recommended and ordered.   Description of procedure given to patient.   If significant Obstructive Sleep Apnea (HOWIE) is found during the initial portion of the study, therapy will be initiated  with nasal Continuous Positive Airway Pressure (CPAP).   Goals of therapy were discussed, alternative treatments listed and patient agrees to this form of therapy if indicated.   The pathophysiology of HOWIE was discussed.   The effects of HOWIE on patient's co-morbid conditions and the increased morbidity and/or mortality associated with this condition were reviewed.   The patient was given open opportunity to ask questions and/or express concerns about treatment plan.   All questions/concerns were discussed.   Driving precautions were provided.       Thank you for referring Braeden Keita for evaluation.

## 2022-02-21 ENCOUNTER — PATIENT MESSAGE (OUTPATIENT)
Dept: INTERNAL MEDICINE | Facility: CLINIC | Age: 27
End: 2022-02-21
Payer: COMMERCIAL

## 2022-02-21 ENCOUNTER — PATIENT MESSAGE (OUTPATIENT)
Dept: SLEEP MEDICINE | Facility: CLINIC | Age: 27
End: 2022-02-21
Payer: COMMERCIAL

## 2022-02-21 DIAGNOSIS — Z23 NEED FOR VACCINATION AGAINST HUMAN PAPILLOMAVIRUS: Primary | ICD-10-CM

## 2022-02-21 NOTE — TELEPHONE ENCOUNTER
Infectious disease referral placed - not entirely sure this is the correct order to get patient to vaccination clinic.

## 2022-02-23 ENCOUNTER — PATIENT MESSAGE (OUTPATIENT)
Dept: INTERNAL MEDICINE | Facility: CLINIC | Age: 27
End: 2022-02-23
Payer: COMMERCIAL

## 2022-02-24 ENCOUNTER — PATIENT MESSAGE (OUTPATIENT)
Dept: SLEEP MEDICINE | Facility: CLINIC | Age: 27
End: 2022-02-24
Payer: COMMERCIAL

## 2022-02-25 ENCOUNTER — TELEPHONE (OUTPATIENT)
Dept: SLEEP MEDICINE | Facility: OTHER | Age: 27
End: 2022-02-25
Payer: COMMERCIAL

## 2022-02-25 DIAGNOSIS — Z23 NEED FOR VACCINATION AGAINST HUMAN PAPILLOMAVIRUS: Primary | ICD-10-CM

## 2022-02-25 NOTE — TELEPHONE ENCOUNTER
Spoke to infectious disease---Dr. Quick will need to put in 3 separate standing order for the Gardasil  Once done patient will need to call infectious disease at number [phone] to schedule the appointment

## 2022-03-09 ENCOUNTER — TELEPHONE (OUTPATIENT)
Dept: SLEEP MEDICINE | Facility: OTHER | Age: 27
End: 2022-03-09
Payer: COMMERCIAL

## 2022-03-31 ENCOUNTER — TELEPHONE (OUTPATIENT)
Dept: SLEEP MEDICINE | Facility: OTHER | Age: 27
End: 2022-03-31
Payer: COMMERCIAL

## 2022-04-01 ENCOUNTER — HOSPITAL ENCOUNTER (OUTPATIENT)
Dept: SLEEP MEDICINE | Facility: OTHER | Age: 27
Discharge: HOME | End: 2022-04-01
Attending: INTERNAL MEDICINE
Payer: COMMERCIAL

## 2022-04-01 DIAGNOSIS — G47.01 INSOMNIA DUE TO MEDICAL CONDITION: ICD-10-CM

## 2022-04-01 DIAGNOSIS — F51.3 SLEEP WALKING AND EATING: ICD-10-CM

## 2022-04-01 DIAGNOSIS — G47.33 OSA (OBSTRUCTIVE SLEEP APNEA): ICD-10-CM

## 2022-04-01 DIAGNOSIS — R06.83 SNORING: ICD-10-CM

## 2022-04-01 PROCEDURE — 95810 POLYSOM 6/> YRS 4/> PARAM: CPT | Mod: 26,,, | Performed by: INTERNAL MEDICINE

## 2022-04-01 PROCEDURE — 95810 PR POLYSOMNOGRAPHY, 4 OR MORE: ICD-10-PCS | Mod: 26,,, | Performed by: INTERNAL MEDICINE

## 2022-04-01 PROCEDURE — 95810 POLYSOM 6/> YRS 4/> PARAM: CPT

## 2022-04-11 ENCOUNTER — PATIENT MESSAGE (OUTPATIENT)
Dept: SLEEP MEDICINE | Facility: CLINIC | Age: 27
End: 2022-04-11
Payer: COMMERCIAL

## 2022-04-11 DIAGNOSIS — G47.01 INSOMNIA DUE TO MEDICAL CONDITION: ICD-10-CM

## 2022-04-11 DIAGNOSIS — F51.3 SLEEP WALKING AND EATING: ICD-10-CM

## 2022-04-11 DIAGNOSIS — G47.33 OSA (OBSTRUCTIVE SLEEP APNEA): Primary | ICD-10-CM

## 2022-04-11 NOTE — PROCEDURES
Patient Name: [facility] #: [id number]   Sex: Male Study Date: 2022   : 1995 Clinic #: [id number]   Age: 26 Referring Physician: Ashley Escamilla MD   Height: 73.0 in Referring Physician #    Weight: 186.0 lbs Sleep Specialist:    ZULAYI.: 24.5 Sleep Specialist #    Hypopnea rule: AASM 1A Scoring Tech: ALEXIS Trinh   Total AHI: 5.5 Recording Tech: HIREN Quach   Lowest O2 sat: 91.0% Recording Location: [facility]     Sleep architecture: This is a baseline polysomnogram. At lights out, the patient fell asleep in 24.3 minutes and slept for 36.6% of the time. Total sleep time (TST) was 153.0 minutes. 8.5% of TST was in Stage N1 sleep, 43.8% TST in slow wave sleep, and 4.2% TST in REM sleep. The REM latency was 77.5 minutes.     Respiratory: Snoring was present. There was significant HOWIE (obstructive sleep apnea) based on AHI (apnea hypopnea index) criteria. The overall AHI was 5.5 with an oxygen magaly of 91.0%.  The supine AHI was 5.5 and the REM AHI was 0.0. The patient did not qualify for a split night study due to an insufficient number of events in the first half of the study.    Motor movement / Parasomnia: There were no significant limb movements of sleep noted.     Cardiac: Cardiac rhythm monitoring revealed a normal sinus rhythm     IMPRESSION:  1. Mild HOWIE   RECOMMENDATION:  1. Therapeutic options include CPAP or oral appliance.                                                                                          
no

## 2022-06-13 DIAGNOSIS — G47.33 OSA (OBSTRUCTIVE SLEEP APNEA): Primary | ICD-10-CM

## 2022-06-16 ENCOUNTER — PATIENT MESSAGE (OUTPATIENT)
Dept: SLEEP MEDICINE | Facility: CLINIC | Age: 27
End: 2022-06-16
Payer: COMMERCIAL

## 2022-07-20 ENCOUNTER — TELEPHONE (OUTPATIENT)
Dept: SLEEP MEDICINE | Facility: CLINIC | Age: 27
End: 2022-07-20
Payer: COMMERCIAL

## 2022-07-20 ENCOUNTER — PATIENT MESSAGE (OUTPATIENT)
Dept: SLEEP MEDICINE | Facility: CLINIC | Age: 27
End: 2022-07-20
Payer: COMMERCIAL

## 2022-07-20 NOTE — TELEPHONE ENCOUNTER
Staff left patient a voicemail to contact office to reschedule appointment with another physician being that Dr Escamilla is not longer here. Patient can be scheduled with RADHA Caceres or Dr. Rothman

## 2022-07-25 ENCOUNTER — TELEPHONE (OUTPATIENT)
Dept: SLEEP MEDICINE | Facility: CLINIC | Age: 27
End: 2022-07-25
Payer: COMMERCIAL

## 2022-07-26 ENCOUNTER — OFFICE VISIT (OUTPATIENT)
Dept: SLEEP MEDICINE | Facility: CLINIC | Age: 27
End: 2022-07-26
Payer: COMMERCIAL

## 2022-07-26 VITALS
DIASTOLIC BLOOD PRESSURE: 72 MMHG | SYSTOLIC BLOOD PRESSURE: 112 MMHG | WEIGHT: 203.06 LBS | BODY MASS INDEX: 26.79 KG/M2 | HEART RATE: 75 BPM

## 2022-07-26 DIAGNOSIS — G47.33 OSA (OBSTRUCTIVE SLEEP APNEA): Primary | ICD-10-CM

## 2022-07-26 PROCEDURE — 99214 PR OFFICE/OUTPT VISIT, EST, LEVL IV, 30-39 MIN: ICD-10-PCS | Mod: S$GLB,,, | Performed by: NURSE PRACTITIONER

## 2022-07-26 PROCEDURE — 99999 PR PBB SHADOW E&M-EST. PATIENT-LVL II: ICD-10-PCS | Mod: PBBFAC,,, | Performed by: NURSE PRACTITIONER

## 2022-07-26 PROCEDURE — 99214 OFFICE O/P EST MOD 30 MIN: CPT | Mod: S$GLB,,, | Performed by: NURSE PRACTITIONER

## 2022-07-26 PROCEDURE — 99999 PR PBB SHADOW E&M-EST. PATIENT-LVL II: CPT | Mod: PBBFAC,,, | Performed by: NURSE PRACTITIONER

## 2022-07-26 NOTE — PROGRESS NOTES
Cc: HOWIE, initial visit with him, last seen byDr. Escamilla 2/2022    He underwent PSG revealing mild HOWIE and began apap 5-11cm 6/8/22. Did much better using nose pillows initially/felt more refreshed and less sleep eating b/c if mask did come off he'd put it back on. Was sleeping longer. Since switching to FFM due to some occasional mouth openinghe's removing mask more/back to sleep eating even more than before. Never offered a chin strap, wants to resume pillow mask    Remote 30d 6/8/22-7/8/22 ag 4:04h/n AHI 2.1, 90% tile 6.1cm    PSG 4/1/22 AHI 5.5/low sat 91%      Assessment:  HOWIE mild adherent with PAP, AHI<5. Benefiting interms of sleep consolidate/more refreshing sleep but needs to change mask style to keep mask on longer    Plan:  Get mouth tape or chin strap/resume nose pillows continue apap 5-11cm. discussed ins use guidelines  [facility] supplies  rtc re-eval 3 mos, sooner if needed

## 2022-09-19 ENCOUNTER — OFFICE VISIT (OUTPATIENT)
Dept: ALLERGY | Facility: CLINIC | Age: 27
End: 2022-09-19
Payer: COMMERCIAL

## 2022-09-19 ENCOUNTER — LAB VISIT (OUTPATIENT)
Dept: LAB | Facility: HOSPITAL | Age: 27
End: 2022-09-19
Attending: ALLERGY & IMMUNOLOGY
Payer: COMMERCIAL

## 2022-09-19 VITALS
HEIGHT: 73 IN | SYSTOLIC BLOOD PRESSURE: 126 MMHG | WEIGHT: 171.75 LBS | DIASTOLIC BLOOD PRESSURE: 79 MMHG | BODY MASS INDEX: 22.76 KG/M2 | OXYGEN SATURATION: 99 % | HEART RATE: 66 BPM

## 2022-09-19 DIAGNOSIS — J02.9 SORE THROAT: Primary | ICD-10-CM

## 2022-09-19 DIAGNOSIS — Z91.018 HX OF FOOD ALLERGY: ICD-10-CM

## 2022-09-19 PROCEDURE — 86003 ALLG SPEC IGE CRUDE XTRC EA: CPT | Performed by: ALLERGY & IMMUNOLOGY

## 2022-09-19 PROCEDURE — 99999 PR PBB SHADOW E&M-EST. PATIENT-LVL III: ICD-10-PCS | Mod: PBBFAC,,, | Performed by: ALLERGY & IMMUNOLOGY

## 2022-09-19 PROCEDURE — 99999 PR PBB SHADOW E&M-EST. PATIENT-LVL III: CPT | Mod: PBBFAC,,, | Performed by: ALLERGY & IMMUNOLOGY

## 2022-09-19 PROCEDURE — 99204 PR OFFICE/OUTPT VISIT, NEW, LEVL IV, 45-59 MIN: ICD-10-PCS | Mod: S$GLB,,, | Performed by: ALLERGY & IMMUNOLOGY

## 2022-09-19 PROCEDURE — 36415 COLL VENOUS BLD VENIPUNCTURE: CPT | Performed by: ALLERGY & IMMUNOLOGY

## 2022-09-19 PROCEDURE — 99204 OFFICE O/P NEW MOD 45 MIN: CPT | Mod: S$GLB,,, | Performed by: ALLERGY & IMMUNOLOGY

## 2022-09-19 PROCEDURE — 86008 ALLG SPEC IGE RECOMB EA: CPT | Performed by: ALLERGY & IMMUNOLOGY

## 2022-09-19 RX ORDER — RUXOLITINIB 15 MG/G
CREAM TOPICAL
COMMUNITY
Start: 2022-04-28

## 2022-09-19 NOTE — PROGRESS NOTES
Subjective:       Patient ID: Braeden Keita is a 26 y.o. male.    Chief Complaint:  Allergic Reaction (Peanut butter and almond butter)      HPI    Over last 4-5 weeks noted inconsistent assoc between ingestion of peanut butter or almond butter with sensation of sore throat which can last from a few hours up to 10-12 hours. Sore throat only noted about half of the times he eats peanut butter or almond butter. When sx's occur, they start usu w/in 5 min of ingestion. Prior to this time often ate peanuts, less often tree nuts, without concern, suspicion of nut allergy.  There has never been any associated urticaria, angioedema, respiratory distress, rhinitis sx's, or GI sx's.  He does have hx GERD. These sensations w the nuts are different.  Prn use antihistamines didn't minimize, shorten duration of sx's.  Had had similar, inconsistent sx's w coconut in the past. Allergy testing was negative w Dr. Sorto. Rarely eats coconut now, but doesn't go out of way to avoid it.    Hx eczema, dx'd 6-7 yrs ago. Now on opselura topical a few months ago, prn. Doesn't appreciated assoc btwn opselura and sx's w PN. Followed by Dr. Delgado, dermatology.   No asthma, No AR    No oral allergy sx's w fresh veg or fruits      Past Medical History:   Diagnosis Date    Acne     Heartburn        History reviewed. No pertinent family history.      Review of Systems   Constitutional:  Negative for activity change, fatigue and fever.   HENT:  Negative for congestion, postnasal drip, rhinorrhea, sinus pressure and sneezing.    Eyes:  Negative for discharge, redness and itching.   Respiratory:  Negative for cough, shortness of breath and wheezing.    Cardiovascular:  Negative for chest pain.   Gastrointestinal:  Negative for constipation, diarrhea, nausea and vomiting.   Genitourinary:  Negative for difficulty urinating.   Musculoskeletal:  Negative for joint swelling and myalgias.   Skin:  Negative for rash.   Neurological:  Negative for  headaches.   Hematological:  Does not bruise/bleed easily.   Psychiatric/Behavioral:  Negative for behavioral problems and sleep disturbance.       Objective:   Physical Exam  Constitutional:       General: He is not in acute distress.     Appearance: He is well-developed. He is not diaphoretic.   HENT:      Head: Normocephalic and atraumatic.      Right Ear: Tympanic membrane and external ear normal.      Left Ear: Tympanic membrane and external ear normal.      Nose: Nose normal.      Mouth/Throat:      Pharynx: No oropharyngeal exudate.   Eyes:      General:         Right eye: No discharge.         Left eye: No discharge.      Conjunctiva/sclera: Conjunctivae normal.   Neck:      Thyroid: No thyromegaly.   Cardiovascular:      Rate and Rhythm: Normal rate and regular rhythm.   Pulmonary:      Effort: Pulmonary effort is normal. No respiratory distress.      Breath sounds: Normal breath sounds. No wheezing.   Abdominal:      General: Bowel sounds are normal. There is no distension.      Palpations: Abdomen is soft.      Tenderness: There is no abdominal tenderness.   Musculoskeletal:         General: Normal range of motion.      Cervical back: Normal range of motion and neck supple.   Lymphadenopathy:      Cervical: No cervical adenopathy.   Skin:     General: Skin is warm.      Findings: No erythema or rash.   Neurological:      Mental Status: He is alert and oriented to person, place, and time.      Motor: No abnormal muscle tone.   Psychiatric:         Behavior: Behavior normal.         Thought Content: Thought content normal.         Judgment: Judgment normal.         No results found for: IGGSERUM, IGM, IGA, IGE  Eos #   Date Value Ref Range Status   09/19/2020 0.1 0.0 - 0.5 K/uL Final     Eosinophil %   Date Value Ref Range Status   09/19/2020 1.0 0.0 - 8.0 % Final       Assessment:       1. Sore throat    2. Hx of food allergy    Low suspicion of IgE mediated food allergy. Sx's not reproducible and have  been self limited     Plan:       Braeden was seen today for allergic reaction.    Diagnoses and all orders for this visit:    Sore throat    Hx of food allergy  -     Allergen, Peanut Components IGE; Future  -     Almonds IgE; Future  Suspect will be negative, then reintroduce.  If positive, consider observed (for documentation) v home oral challenge

## 2022-09-22 LAB
ALLERGY INTERPRETATION: NORMAL
ALMOND IGE QN: <0.1 KU/L
DEPRECATED ALMOND IGE RAST QL: NORMAL
DEPRECATED PEANUT (RARA H) 2 IGE RAST QL: NORMAL
DEPRECATED PEANUT (RARA H) 2 IGE RAST QL: NORMAL
DEPRECATED PEANUT (RARA H) 3 IGE RAST QL: NORMAL
DEPRECATED PEANUT (RARA H) 6 IGE RAST QL: NORMAL
DEPRECATED PEANUT (RARA H) 8 IGE RAST QL: NORMAL
PEANUT (RARA H) 1 IGE QN: <0.1 KU/L
PEANUT (RARA H) 2 IGE QN: <0.1 KU/L
PEANUT (RARA H) 3 IGE QN: <0.1 KU/L
PEANUT (RARA H) 6 IGE QN: <0.1 KU/L
PEANUT (RARA H) 8 IGE QN: <0.1 KU/L
PEANUT (RARA H) 9 IGE QN: <0.1 KU/L
PEANUT (RARA H) 9 IGE QN: NORMAL